# Patient Record
Sex: FEMALE | URBAN - METROPOLITAN AREA
[De-identification: names, ages, dates, MRNs, and addresses within clinical notes are randomized per-mention and may not be internally consistent; named-entity substitution may affect disease eponyms.]

---

## 2017-04-06 ENCOUNTER — TELEPHONE (OUTPATIENT)
Dept: OTHER | Facility: CLINIC | Age: 32
End: 2017-04-06

## 2017-04-06 NOTE — TELEPHONE ENCOUNTER
4/6/2017    Call Regarding Onboarding are Choices     Attempt 1    Message on voicemail     Comments: DEP- 3      Outreach   CC

## 2017-05-30 NOTE — TELEPHONE ENCOUNTER
5/30/2017    Call Regarding Onboarding [employer] choices    Attempt 2    Message on voicemail     Comments:       Outreach   grace

## 2017-06-07 NOTE — TELEPHONE ENCOUNTER
6/7/2017    Call Regarding Onboarding T[employer]    Attempt 3    Message on voicemail     Comments: dep-- spouse and 2 child       Outreach   cnt